# Patient Record
Sex: FEMALE | Race: WHITE | NOT HISPANIC OR LATINO | Employment: UNEMPLOYED | URBAN - METROPOLITAN AREA
[De-identification: names, ages, dates, MRNs, and addresses within clinical notes are randomized per-mention and may not be internally consistent; named-entity substitution may affect disease eponyms.]

---

## 2018-09-03 ENCOUNTER — OFFICE VISIT (OUTPATIENT)
Dept: URGENT CARE | Facility: CLINIC | Age: 43
End: 2018-09-03
Payer: OTHER GOVERNMENT

## 2018-09-03 ENCOUNTER — APPOINTMENT (OUTPATIENT)
Dept: RADIOLOGY | Facility: IMAGING CENTER | Age: 43
End: 2018-09-03
Attending: NURSE PRACTITIONER
Payer: OTHER GOVERNMENT

## 2018-09-03 VITALS
HEIGHT: 64 IN | SYSTOLIC BLOOD PRESSURE: 122 MMHG | BODY MASS INDEX: 25.1 KG/M2 | TEMPERATURE: 98.8 F | WEIGHT: 147 LBS | DIASTOLIC BLOOD PRESSURE: 80 MMHG | OXYGEN SATURATION: 100 % | RESPIRATION RATE: 14 BRPM | HEART RATE: 89 BPM

## 2018-09-03 DIAGNOSIS — R07.9 CHEST PAIN, UNSPECIFIED TYPE: ICD-10-CM

## 2018-09-03 DIAGNOSIS — R06.02 SOB (SHORTNESS OF BREATH): ICD-10-CM

## 2018-09-03 LAB — EKG 4674: NORMAL

## 2018-09-03 PROCEDURE — 93000 ELECTROCARDIOGRAM COMPLETE: CPT | Performed by: NURSE PRACTITIONER

## 2018-09-03 PROCEDURE — 71046 X-RAY EXAM CHEST 2 VIEWS: CPT | Mod: TC | Performed by: NURSE PRACTITIONER

## 2018-09-03 PROCEDURE — 99204 OFFICE O/P NEW MOD 45 MIN: CPT | Performed by: NURSE PRACTITIONER

## 2018-09-03 RX ORDER — LORATADINE 10 MG/1
10 TABLET ORAL DAILY
COMMUNITY
End: 2022-10-12

## 2018-09-03 NOTE — PROGRESS NOTES
Chief Complaint   Patient presents with   • Difficulty Breathing     shortness of breath, chest congestion for past month, worse past 2 weeks       HISTORY OF PRESENT ILLNESS: Patient is a 43 y.o. female who presents to urgent care today with complaints of shortness of breath and chest pain. The patient reports that she has had both shortness of breath and intermittent chest pain for the past month which has been worsening. The chest pain is substernal, lasting seconds to minutes, primarily with laying flat. She admits to shortness of breath, which worsens with exacerbation. She states that she has a difficult time performing daily tasks due to her shortness of breath. She denies associated leg pain, leg swelling, fever, chills, cough, or respiratory distress. She does not take birth control, denies any clotting history. She denies a history of asthma but does admit to a history of seasonal allergies as a child. She has not seen her PCP for this.      There are no active problems to display for this patient.      Allergies:Patient has no known allergies.    Current Outpatient Prescriptions Ordered in [facility]   Medication Sig Dispense Refill   • loratadine (CLARITIN) 10 MG Tab Take 10 mg by mouth every day.       No current [facility]-ordered facility-administered medications on file.        History reviewed. No pertinent past medical history.    Social History   Substance Use Topics   • Smoking status: Never Smoker   • Smokeless tobacco: Never Used   • Alcohol use Not on file       No family status information on file.   History reviewed. No pertinent family history.    ROS:  Review of Systems   Constitutional: Negative for fever, chills, weight loss, malaise, and fatigue.   HENT: Negative for ear pain, nosebleeds, congestion, sore throat and neck pain.    Eyes: Negative for vision changes.   Neuro: Negative for headache, sensory changes, weakness, seizure, LOC.   Cardiovascular: Positive for chest pain. Negative for  "palpitations, orthopnea and leg swelling.   Respiratory: Positive for shortness of breath. Negative for cough, sputum production, and wheezing.   Gastrointestinal: Negative for abdominal pain, nausea, vomiting or diarrhea.   Genitourinary: Negative for dysuria, urgency and frequency.  Musculoskeletal: Negative for falls, neck pain, back pain, joint pain, myalgias.   Skin: Negative for rash, diaphoresis.     Exam:  Blood pressure 122/80, pulse 89, temperature 37.1 °C (98.8 °F), resp. rate 14, height 1.626 m (5' 4\"), weight 66.7 kg (147 lb), SpO2 100 %.  General: well-nourished, well-developed female in NAD  Head: normocephalic, atraumatic  Eyes: PERRLA, no conjunctival injection, acuity grossly intact, lids normal.  Ears: normal shape and symmetry, no tenderness, no discharge. External canals are without any significant edema or erythema. Tympanic membranes are without any inflammation, no effusion. Gross auditory acuity is intact.  Nose: symmetrical without tenderness, no discharge.  Mouth/Throat: reasonable hygiene, no erythema, exudates or tonsillar enlargement.  Neck: no masses, range of motion within normal limits, no tracheal deviation. No obvious thyroid enlargement.   Lymph: no cervical adenopathy. No supraclavicular adenopathy.   Neuro: alert and oriented. Cranial nerves 1-12 grossly intact. No sensory deficit.   Cardiovascular: regular rate and rhythm. No edema.  Pulmonary: no distress. Chest is symmetrical with respiration, no wheezes, crackles, or rhonchi.   Musculoskeletal: no clubbing, appropriate muscle tone, gait is stable.  Skin: warm, dry, intact, no clubbing, no cyanosis, no rashes.   Psych: appropriate mood, affect, judgement.         Assessment/Plan:  1. SOB (shortness of breath)  DX-CHEST-2 VIEWS    EKG   2. Chest pain, unspecified type  DX-CHEST-2 VIEWS    EKG       DX chest reviewed by myself, radiology reading \"No evidence of acute cardiopulmonary process.\"    EKG Interpretation   Interpreted " by me   Rhythm: normal sinus   Rate: normal   Axis: normal   Ectopy: none   Conduction: normal   ST Segments: no acute change   T Waves: no acute change  Clinical Impression: no acute changes and normal EKG        The patient is a pleasant 43-year-old female who presents to the clinic with one month of intermittent chest pain and shortness of breath, symptoms worsening. Her EKG and chest x-ray are negative today. Nevertheless, I discussed with the patient I cannot exclude cardiac and/or significant respiratory etiology in the urgent care setting. Therefore, I feel the patient requires a higher level of care including closer monitoring, stat lab work and/or imaging for further evaluation. This has been discussed with the patient and she states agreement and understanding. I discussed with her that I recommend EMS transfer at this time. However, patient is deciding against medical advice. The patient is a capable decision maker and understands the risks and benefits of her decision. While I certainly disagree with the patient's decision, I respect the patient's autonomy and will not keep her here against her will. She understand that her decision to decline further care can be reversed at any time. She states that she will go directly to the emergency department of her choice without delay. She is in no acute distress upon departure.          Please note that this dictation was created using voice recognition software. I have made every reasonable attempt to correct obvious errors, but I expect that there are errors of grammar and possibly content that I did not discover before finalizing the note.      SELMA Hilliard.

## 2018-11-02 ENCOUNTER — OFFICE VISIT (OUTPATIENT)
Dept: MEDICAL GROUP | Facility: PHYSICIAN GROUP | Age: 43
End: 2018-11-02
Payer: OTHER GOVERNMENT

## 2018-11-02 VITALS
RESPIRATION RATE: 14 BRPM | OXYGEN SATURATION: 98 % | WEIGHT: 151 LBS | HEART RATE: 98 BPM | TEMPERATURE: 98.3 F | DIASTOLIC BLOOD PRESSURE: 62 MMHG | HEIGHT: 64 IN | BODY MASS INDEX: 25.78 KG/M2 | SYSTOLIC BLOOD PRESSURE: 98 MMHG

## 2018-11-02 DIAGNOSIS — R06.02 SHORTNESS OF BREATH: ICD-10-CM

## 2018-11-02 DIAGNOSIS — Z00.00 WELLNESS EXAMINATION: ICD-10-CM

## 2018-11-02 DIAGNOSIS — Z12.31 ENCOUNTER FOR SCREENING MAMMOGRAM FOR BREAST CANCER: ICD-10-CM

## 2018-11-02 DIAGNOSIS — R63.5 WEIGHT GAIN: ICD-10-CM

## 2018-11-02 PROCEDURE — 99214 OFFICE O/P EST MOD 30 MIN: CPT | Performed by: PHYSICIAN ASSISTANT

## 2018-11-02 RX ORDER — ALBUTEROL SULFATE 90 UG/1
2 AEROSOL, METERED RESPIRATORY (INHALATION) EVERY 6 HOURS PRN
Qty: 8.5 G | Refills: 2 | Status: SHIPPED | OUTPATIENT
Start: 2018-11-02 | End: 2022-10-12

## 2018-11-02 ASSESSMENT — PATIENT HEALTH QUESTIONNAIRE - PHQ9: CLINICAL INTERPRETATION OF PHQ2 SCORE: 0

## 2018-11-05 ENCOUNTER — TELEPHONE (OUTPATIENT)
Dept: MEDICAL GROUP | Facility: PHYSICIAN GROUP | Age: 43
End: 2018-11-05

## 2018-11-05 NOTE — TELEPHONE ENCOUNTER
VOICEMAIL  1. Caller Name: Ml                     Call Back Number: [phone] (home)     2. Message: Patient LVM requesting a call back on how she should schedule her PFT.     3. Patient approves office to leave a detailed voicemail/MyChart message: yes

## 2018-11-05 NOTE — TELEPHONE ENCOUNTER
Phone Number Called: [phone] (home)     Message: I spoke with ino and she was driving and requested I call back and leave instructions on her voicemail.    LVM stating she should call [facility] and request to speak with pulmonology and they will schedule her.     Left Message for patient to call back: no

## 2018-11-07 PROBLEM — R63.5 WEIGHT GAIN: Status: ACTIVE | Noted: 2018-11-07

## 2018-11-07 NOTE — ASSESSMENT & PLAN NOTE
Chronic.  She tells me that she relocated from [place] to [place] in 01/18.  States since relocating she is been experiencing shortness of breath when active.  States symptoms are exacerbated when temperatures are high, when walking upstairs, with altitude changes, and when things are blooming.  Denies past medical history for asthma.  She tells me that she grew up in [place] and experienced environmental and seasonal allergies while living there.  She currently denies nasal congestion, rhinorrhea, postnasal drip, sinus pressure, wheezing, nighttime or daytime cough, peripheral edema, fever, chills.  Denies personal history or family history of DVT/PEs.  Patient is on oral contraceptive.  She tells me that her daughter recently had an upper respiratory infection and a humidifier with Vicks was used.  She tells me that was the best night of sleep she has had.  Denies using a humidifier on a regular basis.  She also tells me that she is recently seen in urgent care on Labor Day for shortness of breath.  X-ray and EKG showed no abnormal findings. Per chart review patient left against medical advice.

## 2018-11-07 NOTE — PROGRESS NOTES
Chief Complaint   Patient presents with   • Establish Care     Pt was seen in  for allergist/asthma   • Referral Needed     allergist       HISTORY OF PRESENT ILLNESS: Ml Rodas is an established 43 y.o. female here to discuss the evaluation and management of:    Shortness of breath  Chronic.  She tells me that she relocated from [place] to [place] in 01/18.  States since relocating she is been experiencing shortness of breath when active.  States symptoms are exacerbated when temperatures are high, when walking upstairs, with altitude changes, and when things are blooming.  Denies past medical history for asthma.  She tells me that she grew up in [place] and experienced environmental and seasonal allergies while living there.  She currently denies nasal congestion, rhinorrhea, postnasal drip, sinus pressure, wheezing, nighttime or daytime cough, peripheral edema, fever, chills.  Denies personal history or family history of DVT/PEs.  Patient is on oral contraceptive.  She tells me that her daughter recently had an upper respiratory infection and a humidifier with Vicks was used.  She tells me that was the best night of sleep she has had.  Denies using a humidifier on a regular basis.  She also tells me that she is recently seen in urgent care on Labor Day for shortness of breath.  X-ray and EKG showed no abnormal findings. Per chart review patient left against medical advice.      Weight gain  Patient states since relocating from [place] to [place] in 0 1/18 she has gained 15 lbs.  Admits she is not as active as she once was.  States her diet is healthy.  Patient is concerned about weight gain.      Patient Active Problem List    Diagnosis Date Noted   • Weight gain 11/07/2018   • Shortness of breath 11/02/2018       Allergies:Patient has no known allergies.    Current Outpatient Prescriptions   Medication Sig Dispense Refill   • albuterol 108 (90 Base) MCG/ACT Aero Soln inhalation aerosol Inhale  2 Puffs by mouth every 6 hours as needed for Shortness of Breath. 8.5 g 2   • loratadine (CLARITIN) 10 MG Tab Take 10 mg by mouth every day.       No current facility-administered medications for this visit.        Social History   Substance Use Topics   • Smoking status: Never Smoker   • Smokeless tobacco: Never Used   • Alcohol use Yes      Comment: 1-2 glasses of wine per night.        Family Status   Relation Status   • Mo Alive   • Fa Alive   • Sis Alive   • MGMo Alive   • MGFa    • PGMo    • PGFa    • Sis Alive   • Son Alive   • Jose Alive     Family History   Problem Relation Age of Onset   • Other Mother         Liver (PBC)   • Prostate cancer Father    • Lung Disease Maternal Grandfather         Smoker   • Heart Disease Paternal Grandmother    • Heart Disease Paternal Grandfather    • No Known Problems Son    • No Known Problems Daughter        ROS:  Review of Systems   Constitutional: Negative for fever, chills, weight loss and malaise/fatigue.  Positive for weight gain.  HENT: Negative for ear pain, nosebleeds, congestion, sore throat and neck pain.    Eyes: Negative for blurred vision.   Respiratory: Negative for cough, sputum production, and wheezing.  Positive for shortness of breath.  Cardiovascular: Negative for chest pain, palpitations, orthopnea and leg swelling.   Gastrointestinal: Negative for heartburn, nausea, vomiting and abdominal pain.   Genitourinary: Negative for dysuria, urgency and frequency.   Musculoskeletal: Negative for myalgias, back pain and joint pain.   Skin: Negative for rash and itching.   Neurological: Negative for dizziness, tingling, tremors, sensory change, focal weakness and headaches.   Endo/Heme/Allergies: Does not bruise/bleed easily.   Psychiatric/Behavioral: Negative for depression, suicidal ideas and memory loss.  The patient is not nervous/anxious and does not have insomnia.    All other systems reviewed and are negative except as in  "HPI.    Exam: Blood pressure (!) 98/62, pulse 98, temperature 36.8 °C (98.3 °F), resp. rate 14, height 1.626 m (5' 4\"), weight 68.5 kg (151 lb), SpO2 98 %. Body mass index is 25.92 kg/m².  General: Normal appearing. No distress.  HEENT: Normocephalic. Eyes conjunctiva clear lids without ptosis, pupils equal and reactive to light accommodation, ears normal shape and contour, canals are clear bilaterally, tympanic membranes are benign, nasal mucosa benign, oropharynx is without erythema, edema or exudates.   Neck: Supple without JVD or bruit. Thyroid is not enlarged.  Pulmonary: Clear to ausculation.  Normal effort. No rales, ronchi, or wheezing.  Cardiovascular: Regular rate and rhythm without murmur. Carotid and radial pulses are intact and equal bilaterally.  Abdomen: Soft, nontender, nondistended. Normal bowel sounds. Liver and spleen are not palpable  Neurologic: Grossly nonfocal.  Cranial nerves are normal.  Lymph: No cervical, supraclavicular or axillary lymph nodes are palpable  Skin: Warm and dry.  No rashes or suspicious skin lesions.  Musculoskeletal: Normal gait. No extremity cyanosis, clubbing, or edema.  Psych: Normal mood and affect. Alert and oriented x3. Judgment and insight is normal.    Medical decision-making and discussion:  1. Shortness of breath  During today's appointment PFTs have been ordered for patient complete.  Patient will be contacted with results.  Patient has been prescribed an albuterol inhaler and advised to take 2 puffs by mouth every 6 hours as needed for shortness of breath.  Advised patient to use inhaler at least 15 minutes prior to activity.  Patient to rinse mouth out after using inhaler.  Discussed common side effects and adverse reactions of medication with patient.  Advised patient if she ever develops shortness of breath that is not alleviated with medication to seek immediate emergency care.  Advised patient to use Claritin or Zyrtec.  Also suggested using a humidifier " in the home.    Depending on PFT results will consider referral to an allergist.  Patient may need a cardiac workup as well.    Discussed importance of healthy diet regular exercise routine with patient.    - PULMONARY FUNCTION TESTS -Test requested: Complete Pulmonary Function Test; Future  - albuterol 108 (90 Base) MCG/ACT Aero Soln inhalation aerosol; Inhale 2 Puffs by mouth every 6 hours as needed for Shortness of Breath.  Dispense: 8.5 g; Refill: 2    2. Wellness examination  In the lab work has been ordered for patient complete.  Patient will be contacted with results.  Emphasized importance of healthy diet regular exercise routine with patient.  She denies doing self breast exams or taking a multivitamin.  Advised patient to do a self breast exam at least once monthly.  Advised patient to make sure that she is getting adequate enough calcium and vitamin D.    - COMP METABOLIC PANEL; Future  - LIPID PROFILE; Future    3. Encounter for screening mammogram for breast cancer  Discussed importance of being screened for breast cancer with patient.  A mammogram has been ordered.  - MA-SCREENING MAMMO BILAT W/TOMOSYNTHESIS W/CAD; Future    4. Weight gain  - Encouraged diet high in fruits, vegetables, and fiber. And a diet low in salt, refined carbohydrates, cholesterol, saturated fat, and trans fatty acids.    - Encouraged  a minimum of 30 minutes of moderate intensity aerobic exercise (eg, brisk walking) is recommended on five days each week. Or 20 minutes of vigorous-intensity aerobic exercise (eg, jogging) on three days each week.           Please note that this dictation was created using voice recognition software. I have made every reasonable attempt to correct obvious errors, but I expect that there are errors of grammar and possibly content that I did not discover before finalizing the note.      Return if symptoms worsen or fail to improve.

## 2018-11-07 NOTE — ASSESSMENT & PLAN NOTE
Patient states since relocating from [place] to [place] in 0 1/18 she has gained 15 lbs.  Admits she is not as active as she once was.  States her diet is healthy.  Patient is concerned about weight gain.

## 2018-11-30 ENCOUNTER — HOSPITAL ENCOUNTER (OUTPATIENT)
Dept: RADIOLOGY | Facility: MEDICAL CENTER | Age: 43
End: 2018-11-30
Attending: PHYSICIAN ASSISTANT
Payer: OTHER GOVERNMENT

## 2018-11-30 DIAGNOSIS — Z12.31 ENCOUNTER FOR SCREENING MAMMOGRAM FOR BREAST CANCER: ICD-10-CM

## 2018-11-30 PROCEDURE — 77067 SCR MAMMO BI INCL CAD: CPT

## 2018-12-03 ENCOUNTER — HOSPITAL ENCOUNTER (OUTPATIENT)
Dept: PULMONOLOGY | Facility: MEDICAL CENTER | Age: 43
End: 2018-12-03
Attending: PHYSICIAN ASSISTANT
Payer: OTHER GOVERNMENT

## 2018-12-03 PROCEDURE — 94729 DIFFUSING CAPACITY: CPT

## 2018-12-03 PROCEDURE — 94729 DIFFUSING CAPACITY: CPT | Mod: 26 | Performed by: INTERNAL MEDICINE

## 2018-12-03 PROCEDURE — 94060 EVALUATION OF WHEEZING: CPT | Mod: 26 | Performed by: INTERNAL MEDICINE

## 2018-12-03 PROCEDURE — 94726 PLETHYSMOGRAPHY LUNG VOLUMES: CPT

## 2018-12-03 PROCEDURE — 94060 EVALUATION OF WHEEZING: CPT

## 2018-12-03 PROCEDURE — 94726 PLETHYSMOGRAPHY LUNG VOLUMES: CPT | Mod: 26 | Performed by: INTERNAL MEDICINE

## 2018-12-03 ASSESSMENT — PULMONARY FUNCTION TESTS
FEV1/FVC: 86
FEV1/FVC_PERCENT_PREDICTED: 103
FVC_PERCENT_PREDICTED: 123
FEV1/FVC_PERCENT_PREDICTED: 105
FEV1_PERCENT_CHANGE: 3
FVC_LLN: 3.06
FVC_LLN: 3.06
FEV1_PERCENT_CHANGE: 1
FVC_PERCENT_PREDICTED: 121
FEV1_LLN: 2.50
FEV1/FVC_PERCENT_CHANGE: 300
FEV1: 3.91
FVC_PREDICTED: 3.66
FEV1/FVC_PERCENT_CHANGE: 2
FEV1/FVC_PERCENT_PREDICTED: 82
FEV1/FVC_PERCENT_LLN: 68
FEV1/FVC: 84
FEV1/FVC_PERCENT_PREDICTED: 106
FEV1/FVC_PERCENT_PREDICTED: 103
FEV1: 3.76
FEV1/FVC_PERCENT_LLN: 68
FEV1/FVC: 84
FEV1/FVC_PREDICTED: 82
FEV1_PERCENT_PREDICTED: 125
FVC: 4.45
FEV1_PREDICTED: 2.99
FVC: 4.53
FEV1_PERCENT_PREDICTED: 130
FEV1_LLN: 2.50
FEV1/FVC: 86.31

## 2018-12-04 ENCOUNTER — TELEPHONE (OUTPATIENT)
Dept: MEDICAL GROUP | Facility: PHYSICIAN GROUP | Age: 43
End: 2018-12-04

## 2018-12-04 NOTE — TELEPHONE ENCOUNTER
----- Message from Heidy Cobb P.A.-C. sent at 12/4/2018  8:10 AM PST -----  Please call patient. Your mammogram came back no evidence of malignancy. You however have dense breasts which may miss small masses. You have an option of doing ultrasound for dense breasts called [facility]. This is not covered by your insurance and can cost close to $200. If you want to do this test please let me know so I can order it. Otherwise we will just do the yearly screening mammogram.      Thank you,    Kimmie TALLEY

## 2018-12-04 NOTE — TELEPHONE ENCOUNTER
Phone Number Called: [phone] (home)       Message: Pt informed by phone.    Left Message for patient to call back: N\A

## 2018-12-05 NOTE — PROCEDURES
REQUESTING PROVIDER:  Heidy Cobb PA-C    REASON FOR REQUEST:  Shortness of breath.    FINDINGS:  1.  Acceptable and reproducible.  2.  FEV1 3.76 liters (125%), FVC 4.45 liters (121%), ratio 84%.  3.  Flow volume loops, normal appearing.  4.  TLC 5.96 liters (117%).  5.  DLCO 30.74 mL/min/mmHg (140%).    IMPRESSION:  Normal spirometry and no response to bronchodilator.  Normal lung   volumes.  Elevated DLCO, which is not unusual at high altitude.       ____________________________________     MD FRANKO Tolliver / ÓSCAR    DD:  12/05/2018 09:47:40  DT:  12/05/2018 10:32:06    D#:  [id number]  Job#:  [id number]    cc: Heidy Cobb PA-C

## 2019-04-09 ENCOUNTER — TELEPHONE (OUTPATIENT)
Dept: MEDICAL GROUP | Facility: PHYSICIAN GROUP | Age: 44
End: 2019-04-09

## 2019-04-09 DIAGNOSIS — Z76.89 ENCOUNTER TO ESTABLISH CARE: ICD-10-CM

## 2019-04-09 NOTE — TELEPHONE ENCOUNTER
Phone Number Called: [phone] (home)       Message: Pt called and stated that when she was seen back in November, she was supposed to get a referral to an allergist.    I do not see a referral was ever placed.      Left Message for patient to call back: N\A

## 2019-04-12 ENCOUNTER — TELEPHONE (OUTPATIENT)
Dept: MEDICAL GROUP | Facility: PHYSICIAN GROUP | Age: 44
End: 2019-04-12

## 2020-01-10 ENCOUNTER — APPOINTMENT (OUTPATIENT)
Dept: MEDICAL GROUP | Facility: PHYSICIAN GROUP | Age: 45
End: 2020-01-10
Payer: OTHER GOVERNMENT

## 2022-09-08 ENCOUNTER — TELEPHONE (OUTPATIENT)
Dept: SCHEDULING | Facility: IMAGING CENTER | Age: 47
End: 2022-09-08

## 2022-10-10 SDOH — ECONOMIC STABILITY: INCOME INSECURITY: IN THE LAST 12 MONTHS, WAS THERE A TIME WHEN YOU WERE NOT ABLE TO PAY THE MORTGAGE OR RENT ON TIME?: NO

## 2022-10-10 SDOH — ECONOMIC STABILITY: FOOD INSECURITY: WITHIN THE PAST 12 MONTHS, YOU WORRIED THAT YOUR FOOD WOULD RUN OUT BEFORE YOU GOT MONEY TO BUY MORE.: NEVER TRUE

## 2022-10-10 SDOH — ECONOMIC STABILITY: INCOME INSECURITY: HOW HARD IS IT FOR YOU TO PAY FOR THE VERY BASICS LIKE FOOD, HOUSING, MEDICAL CARE, AND HEATING?: NOT HARD AT ALL

## 2022-10-10 SDOH — ECONOMIC STABILITY: TRANSPORTATION INSECURITY
IN THE PAST 12 MONTHS, HAS THE LACK OF TRANSPORTATION KEPT YOU FROM MEDICAL APPOINTMENTS OR FROM GETTING MEDICATIONS?: NO

## 2022-10-10 SDOH — HEALTH STABILITY: PHYSICAL HEALTH: ON AVERAGE, HOW MANY DAYS PER WEEK DO YOU ENGAGE IN MODERATE TO STRENUOUS EXERCISE (LIKE A BRISK WALK)?: 7 DAYS

## 2022-10-10 SDOH — ECONOMIC STABILITY: TRANSPORTATION INSECURITY
IN THE PAST 12 MONTHS, HAS LACK OF TRANSPORTATION KEPT YOU FROM MEETINGS, WORK, OR FROM GETTING THINGS NEEDED FOR DAILY LIVING?: NO

## 2022-10-10 SDOH — ECONOMIC STABILITY: HOUSING INSECURITY: IN THE LAST 12 MONTHS, HOW MANY PLACES HAVE YOU LIVED?: 1

## 2022-10-10 SDOH — ECONOMIC STABILITY: HOUSING INSECURITY
IN THE LAST 12 MONTHS, WAS THERE A TIME WHEN YOU DID NOT HAVE A STEADY PLACE TO SLEEP OR SLEPT IN A SHELTER (INCLUDING NOW)?: NO

## 2022-10-10 SDOH — ECONOMIC STABILITY: TRANSPORTATION INSECURITY
IN THE PAST 12 MONTHS, HAS LACK OF RELIABLE TRANSPORTATION KEPT YOU FROM MEDICAL APPOINTMENTS, MEETINGS, WORK OR FROM GETTING THINGS NEEDED FOR DAILY LIVING?: NO

## 2022-10-10 SDOH — HEALTH STABILITY: PHYSICAL HEALTH: ON AVERAGE, HOW MANY MINUTES DO YOU ENGAGE IN EXERCISE AT THIS LEVEL?: 40 MIN

## 2022-10-10 SDOH — HEALTH STABILITY: MENTAL HEALTH
STRESS IS WHEN SOMEONE FEELS TENSE, NERVOUS, ANXIOUS, OR CAN'T SLEEP AT NIGHT BECAUSE THEIR MIND IS TROUBLED. HOW STRESSED ARE YOU?: ONLY A LITTLE

## 2022-10-10 SDOH — ECONOMIC STABILITY: FOOD INSECURITY: WITHIN THE PAST 12 MONTHS, THE FOOD YOU BOUGHT JUST DIDN'T LAST AND YOU DIDN'T HAVE MONEY TO GET MORE.: NEVER TRUE

## 2022-10-10 ASSESSMENT — SOCIAL DETERMINANTS OF HEALTH (SDOH)
HOW OFTEN DO YOU HAVE A DRINK CONTAINING ALCOHOL: 4 OR MORE TIMES A WEEK
HOW MANY DRINKS CONTAINING ALCOHOL DO YOU HAVE ON A TYPICAL DAY WHEN YOU ARE DRINKING: 1 OR 2
HOW OFTEN DO YOU ATTEND CHURCH OR RELIGIOUS SERVICES?: MORE THAN 4 TIMES PER YEAR
HOW OFTEN DO YOU GET TOGETHER WITH FRIENDS OR RELATIVES?: MORE THAN THREE TIMES A WEEK
IN A TYPICAL WEEK, HOW MANY TIMES DO YOU TALK ON THE PHONE WITH FAMILY, FRIENDS, OR NEIGHBORS?: MORE THAN THREE TIMES A WEEK
HOW OFTEN DO YOU ATTENT MEETINGS OF THE CLUB OR ORGANIZATION YOU BELONG TO?: MORE THAN 4 TIMES PER YEAR
HOW OFTEN DO YOU ATTENT MEETINGS OF THE CLUB OR ORGANIZATION YOU BELONG TO?: MORE THAN 4 TIMES PER YEAR
HOW OFTEN DO YOU ATTEND CHURCH OR RELIGIOUS SERVICES?: MORE THAN 4 TIMES PER YEAR
WITHIN THE PAST 12 MONTHS, YOU WORRIED THAT YOUR FOOD WOULD RUN OUT BEFORE YOU GOT THE MONEY TO BUY MORE: NEVER TRUE
DO YOU BELONG TO ANY CLUBS OR ORGANIZATIONS SUCH AS CHURCH GROUPS UNIONS, FRATERNAL OR ATHLETIC GROUPS, OR SCHOOL GROUPS?: YES
HOW OFTEN DO YOU GET TOGETHER WITH FRIENDS OR RELATIVES?: MORE THAN THREE TIMES A WEEK
DO YOU BELONG TO ANY CLUBS OR ORGANIZATIONS SUCH AS CHURCH GROUPS UNIONS, FRATERNAL OR ATHLETIC GROUPS, OR SCHOOL GROUPS?: YES
HOW OFTEN DO YOU HAVE SIX OR MORE DRINKS ON ONE OCCASION: NEVER
IN A TYPICAL WEEK, HOW MANY TIMES DO YOU TALK ON THE PHONE WITH FAMILY, FRIENDS, OR NEIGHBORS?: MORE THAN THREE TIMES A WEEK
HOW HARD IS IT FOR YOU TO PAY FOR THE VERY BASICS LIKE FOOD, HOUSING, MEDICAL CARE, AND HEATING?: NOT HARD AT ALL

## 2022-10-10 ASSESSMENT — LIFESTYLE VARIABLES
HOW OFTEN DO YOU HAVE A DRINK CONTAINING ALCOHOL: 4 OR MORE TIMES A WEEK
AUDIT-C TOTAL SCORE: 4
HOW MANY STANDARD DRINKS CONTAINING ALCOHOL DO YOU HAVE ON A TYPICAL DAY: 1 OR 2
SKIP TO QUESTIONS 9-10: 1
HOW OFTEN DO YOU HAVE SIX OR MORE DRINKS ON ONE OCCASION: NEVER

## 2022-10-12 ENCOUNTER — OFFICE VISIT (OUTPATIENT)
Dept: MEDICAL GROUP | Facility: PHYSICIAN GROUP | Age: 47
End: 2022-10-12
Payer: OTHER GOVERNMENT

## 2022-10-12 VITALS
DIASTOLIC BLOOD PRESSURE: 70 MMHG | HEIGHT: 64 IN | WEIGHT: 161.2 LBS | TEMPERATURE: 97.5 F | OXYGEN SATURATION: 100 % | SYSTOLIC BLOOD PRESSURE: 106 MMHG | BODY MASS INDEX: 27.52 KG/M2 | HEART RATE: 57 BPM

## 2022-10-12 DIAGNOSIS — Z91.09 ENVIRONMENTAL ALLERGIES: ICD-10-CM

## 2022-10-12 DIAGNOSIS — D22.9 ATYPICAL MOLE: ICD-10-CM

## 2022-10-12 PROBLEM — R63.5 WEIGHT GAIN: Status: RESOLVED | Noted: 2018-11-07 | Resolved: 2022-10-12

## 2022-10-12 PROBLEM — R06.02 SHORTNESS OF BREATH: Status: RESOLVED | Noted: 2018-11-02 | Resolved: 2022-10-12

## 2022-10-12 PROCEDURE — 99204 OFFICE O/P NEW MOD 45 MIN: CPT

## 2022-10-12 RX ORDER — CETIRIZINE HYDROCHLORIDE 10 MG/1
CAPSULE, LIQUID FILLED ORAL
COMMUNITY

## 2022-10-12 ASSESSMENT — PATIENT HEALTH QUESTIONNAIRE - PHQ9: CLINICAL INTERPRETATION OF PHQ2 SCORE: 0

## 2022-10-12 NOTE — ASSESSMENT & PLAN NOTE
-Chronic, stable  -Aim to lose apx. 2-4 lbs per month  -Encourage healthy diet and exercise as discussed  -Labs as follows:   Hip Bursitis Rehab  Ask your health care provider which exercises are safe for you. Do exercises exactly as told by your health care provider and adjust them as directed. It is normal to feel mild stretching, pulling, tightness, or discomfort as you do these exercises. Stop right away if you feel sudden pain or your pain gets worse. Do not begin these exercises until told by your health care provider.  Stretching exercise  This exercise warms up your muscles and joints and improves the movement and flexibility of your hip. This exercise also helps to relieve pain and stiffness.  Iliotibial band stretch  An iliotibial band is a strong band of muscle tissue that runs from the outer side of your hip to the outer side of your thigh and knee.  1. Lie on your side with your left / right leg in the top position.  2. Bend your left / right knee and grab your ankle. Stretch out your bottom arm to help you balance.  3. Slowly bring your knee back so your thigh is behind your body.  4. Slowly lower your knee toward the floor until you feel a gentle stretch on the outside of your left / right thigh. If you do not feel a stretch and your knee will not fall farther, place the heel of your other foot on top of your knee and pull your knee down toward the floor with your foot.  5. Hold this position for __________ seconds.  6. Slowly return to the starting position.  Repeat __________ times. Complete this exercise __________ times a day.  Strengthening exercises  These exercises build strength and endurance in your hip and pelvis. Endurance is the ability to use your muscles for a long time, even after they get tired.  Bridge  This exercise strengthens the muscles that move your thigh backward (hip extensors).  1. Lie on your back on a firm surface with your knees bent and your feet flat on the floor.  2. Tighten your buttocks muscles and lift your buttocks off the floor until your trunk is level with your thighs.  ? Do not arch  your back.  ? You should feel the muscles working in your buttocks and the back of your thighs. If you do not feel these muscles, slide your feet 1-2 inches (2.5-5 cm) farther away from your buttocks.  ? If this exercise is too easy, try doing it with your arms crossed over your chest.  3. Hold this position for __________ seconds.  4. Slowly lower your hips to the starting position.  5. Let your muscles relax completely after each repetition.  Repeat __________ times. Complete this exercise __________ times a day.  Squats  This exercise strengthens the muscles in front of your thigh and knee (quadriceps).  1.  front of a table, with your feet and knees pointing straight ahead. You may rest your hands on the table for balance but not for support.  2. Slowly bend your knees and lower your hips like you are going to sit in a chair.  ? Keep your weight over your heels, not over your toes.  ? Keep your lower legs upright so they are parallel with the table legs.  ? Do not let your hips go lower than your knees.  ? Do not bend lower than told by your health care provider.  ? If your hip pain increases, do not bend as low.  3. Hold the squat position for __________ seconds.  4. Slowly push with your legs to return to standing. Do not use your hands to pull yourself to standing.  Repeat __________ times. Complete this exercise __________ times a day.  Hip hike  1. Stand sideways on a bottom step. Stand on your left / right leg with your other foot unsupported next to the step. You can hold on to the railing or wall for balance if needed.  2. Keep your knees straight and your torso square. Then lift your left / right hip up toward the ceiling.  3. Hold this position for __________ seconds.  4. Slowly let your left / right hip lower toward the floor, past the starting position. Your foot should get closer to the floor. Do not lean or bend your knees.  Repeat __________ times. Complete this exercise __________ times a  day.  Single leg stand  1. Without shoes, stand near a railing or in a doorway. You may hold on to the railing or door frame as needed for balance.  2. Squeeze your left / right buttock muscles, then lift up your other foot.  ? Do not let your left / right hip push out to the side.  ? It is helpful to  front of a mirror for this exercise so you can watch your hip.  3. Hold this position for __________ seconds.  Repeat __________ times. Complete this exercise __________ times a day.  This information is not intended to replace advice given to you by your health care provider. Make sure you discuss any questions you have with your health care provider.  Document Released: 01/25/2006 Document Revised: 04/13/2020 Document Reviewed: 04/13/2020  [employer] Patient Education © 2020 [employer]    Hip Bursitis    Hip bursitis is swelling of a fluid-filled sac (bursa) in your hip joint. This swelling (inflammation) can be painful. This condition may come and go over time.  What are the causes?  · Injury to the hip.  · Overuse of the muscles that surround the hip joint.  · An earlier injury or surgery of the hip.  · Arthritis or gout.  · Diabetes.  · Thyroid disease.  · Infection.  · In some cases, the cause may not be known.  What are the signs or symptoms?  · Mild or moderate pain in the hip area. Pain may get worse with movement.  · Tenderness and swelling of the hip, especially on the outer side of the hip.  · In rare cases, the bursa may become infected. This may cause:  ? A fever.  ? Warmth and redness in the area.  Symptoms may come and go.  How is this treated?  This condition is treated by resting, icing, applying pressure (compression), and raising (elevating) the injured area. You may hear this called the RICE treatment.  Treatment may also include:  · Using crutches.  · Draining fluid out of the bursa to help relieve swelling.  · Giving a shot of (injecting) medicine that helps to reduce swelling  (cortisone).  · Other medicines if the bursa is infected.  Follow these instructions at home:  Managing pain, stiffness, and swelling    · If told, put ice on the painful area.  ? Put ice in a plastic bag.  ? Place a towel between your skin and the bag.  ? Leave the ice on for 20 minutes, 2-3 times a day.  ? Raise (elevate) your hip above the level of your heart as much as you can without pain. To do this, try putting a pillow under your hips while you lie down. Stop if this causes pain.  Activity  · Return to your normal activities as told by your doctor. Ask your doctor what activities are safe for you.  · Rest and protect your hip as much as you can until you feel better.  General instructions  · Take over-the-counter and prescription medicines only as told by your doctor.  · Wear wraps that put pressure on your hip (compression wraps) only as told by your doctor.  · Do not use your hip to support your body weight until your doctor says that you can.  · Use crutches as told by your doctor.  · Gently rub and stretch your injured area as often as is comfortable.  · Keep all follow-up visits as told by your doctor. This is important.  How is this prevented?  · Exercise regularly, as told by your doctor.  · Warm up and stretch before being active.  · Cool down and stretch after being active.  · Avoid activities that bother your hip or cause pain.  · Avoid sitting down for long periods at a time.  Contact a doctor if:  · You have a fever.  · You get new symptoms.  · You have trouble walking.  · You have trouble doing everyday activities.  · You have pain that gets worse.  · You have pain that does not get better with medicine.  · You get red skin on your hip area.  · You get a feeling of warmth in your hip area.  Get help right away if:  · You cannot move your hip.  · You have very bad pain.  Summary  · Hip bursitis is swelling of a fluid-filled sac (bursa) in your hip.  · Hip bursitis can be painful.  · Symptoms  often come and go over time.  · This condition is treated with rest, ice, compression, elevation, and medicines.  This information is not intended to replace advice given to you by your health care provider. Make sure you discuss any questions you have with your health care provider.  Document Released: 01/20/2012 Document Revised: 08/26/2019 Document Reviewed: 08/26/2019  [employer] Patient Education © 2020 [employer]

## 2022-10-12 NOTE — ASSESSMENT & PLAN NOTE
Chronic condition stable  - Continue to avoid known triggers  - Continue to take over-the-counter Zyrtec 10 mg daily  - ED precautions given for any shortness of breath or chest pain, progression or worsening of this condition

## 2022-10-12 NOTE — PROGRESS NOTES
"Subjective:     CC:  Diagnoses of Environmental allergies, Atypical mole, and BMI 27.0-27.9,adult were pertinent to this visit.    HISTORY OF THE PRESENT ILLNESS: Patient is a 47 y.o. female. This pleasant patient is here today to establish care and discuss the following problems:    Problem   Environmental Allergies    Chronic  Reports to get occasional SOB, chest heaviness, cough with certain exposures such as scented products (bleach, windex, lavendar, candles). If avoidance occurs she is not bothered by this  -Taking Zyrtec daily which is helpful  -Denies exercise intolerance     Atypical Mole    Patient reports 2 atypical moles on her face, located right cheek and right chin, each apx. 3-5mm in diameter, flesh color, raised.  No evidence of redness or infectious process. These moles have been present most of her life.  No change reported in size, shape, or color  -The mole on her chin has recently started to become pruritic in nature.  This is irritating to her and she would like referral to derm for removal.     Bmi 27.0-27.9,Adult    This is a chronic condition.   Max weight: 161  Current weight: 161  BMI: 27.67  Diet: Regular diet, mostly healthy, occasional fast food  Exercise: 7 days per week: HIIT, strength train, walking (1 mile per day)  Goal Weight: 145       Weight Gain (Resolved)   Shortness of Breath (Resolved)       Health Maintenance: Recommend follow-up for Pap smear and health maintenance topics    ROS:   Review of Systems   All other systems reviewed and are negative.      Objective:     Exam: /70 (BP Location: Left arm, Patient Position: Sitting, BP Cuff Size: Adult)   Pulse (!) 57   Temp 36.4 °C (97.5 °F) (Temporal)   Ht 1.626 m (5' 4\")   Wt 73.1 kg (161 lb 3.2 oz)   SpO2 100%  Body mass index is 27.67 kg/m².    Physical Exam  Constitutional:       General: She is not in acute distress.     Appearance: Normal appearance. She is not ill-appearing or toxic-appearing.   HENT:      " Head: Normocephalic.   Eyes:      Conjunctiva/sclera: Conjunctivae normal.   Cardiovascular:      Rate and Rhythm: Normal rate and regular rhythm.      Pulses: Normal pulses.      Heart sounds: Normal heart sounds. No murmur heard.  Pulmonary:      Effort: Pulmonary effort is normal. No respiratory distress.      Breath sounds: Normal breath sounds.   Skin:     General: Skin is warm and dry.   Neurological:      General: No focal deficit present.      Mental Status: She is alert and oriented to person, place, and time.   Psychiatric:         Mood and Affect: Mood normal.         Behavior: Behavior normal.         Labs: No recent labs    Assessment & Plan: Medical Decision Making   47 y.o. female with the following -    Problem List Items Addressed This Visit       Environmental allergies     Chronic condition stable  - Continue to avoid known triggers  - Continue to take over-the-counter Zyrtec 10 mg daily  - ED precautions given for any shortness of breath or chest pain, progression or worsening of this condition         Atypical mole    Relevant Orders    Referral to Dermatology    BMI 27.0-27.9,adult     -Chronic, stable  -Aim to lose apx. 2-4 lbs per month  -Encourage healthy diet and exercise as discussed  -Labs as follows:         Relevant Orders    Comp Metabolic Panel    Lipid Profile    TSH WITH REFLEX TO FT4       Differential diagnosis, natural history, supportive care, and indications for immediate follow-up discussed.  Shared decision making approach was utilized, and patient is amendable with plan of care.  Patient understands to return to clinic or go to the emergency department if symptoms worsen. All questions and concerns addressed.      Return in about 4 weeks (around 11/9/2022) for Wellness with Pap.    Please note that this dictation was created using voice recognition software. I have made every reasonable attempt to correct obvious errors, but I expect that there are errors of grammar and  possibly content that I did not discover before finalizing the note.

## 2025-08-17 SDOH — ECONOMIC STABILITY: INCOME INSECURITY: HOW HARD IS IT FOR YOU TO PAY FOR THE VERY BASICS LIKE FOOD, HOUSING, MEDICAL CARE, AND HEATING?: NOT HARD AT ALL

## 2025-08-17 SDOH — ECONOMIC STABILITY: FOOD INSECURITY: WITHIN THE PAST 12 MONTHS, THE FOOD YOU BOUGHT JUST DIDN'T LAST AND YOU DIDN'T HAVE MONEY TO GET MORE.: NEVER TRUE

## 2025-08-17 SDOH — HEALTH STABILITY: MENTAL HEALTH
STRESS IS WHEN SOMEONE FEELS TENSE, NERVOUS, ANXIOUS, OR CAN'T SLEEP AT NIGHT BECAUSE THEIR MIND IS TROUBLED. HOW STRESSED ARE YOU?: NOT AT ALL

## 2025-08-17 SDOH — HEALTH STABILITY: PHYSICAL HEALTH: ON AVERAGE, HOW MANY DAYS PER WEEK DO YOU ENGAGE IN MODERATE TO STRENUOUS EXERCISE (LIKE A BRISK WALK)?: 7 DAYS

## 2025-08-17 SDOH — ECONOMIC STABILITY: INCOME INSECURITY: IN THE LAST 12 MONTHS, WAS THERE A TIME WHEN YOU WERE NOT ABLE TO PAY THE MORTGAGE OR RENT ON TIME?: NO

## 2025-08-17 SDOH — ECONOMIC STABILITY: FOOD INSECURITY: WITHIN THE PAST 12 MONTHS, YOU WORRIED THAT YOUR FOOD WOULD RUN OUT BEFORE YOU GOT MONEY TO BUY MORE.: NEVER TRUE

## 2025-08-17 SDOH — HEALTH STABILITY: PHYSICAL HEALTH: ON AVERAGE, HOW MANY MINUTES DO YOU ENGAGE IN EXERCISE AT THIS LEVEL?: 90 MIN

## 2025-08-17 ASSESSMENT — SOCIAL DETERMINANTS OF HEALTH (SDOH)
IN A TYPICAL WEEK, HOW MANY TIMES DO YOU TALK ON THE PHONE WITH FAMILY, FRIENDS, OR NEIGHBORS?: MORE THAN THREE TIMES A WEEK
HOW OFTEN DO YOU HAVE A DRINK CONTAINING ALCOHOL: 4 OR MORE TIMES A WEEK
IN THE PAST 12 MONTHS, HAS THE ELECTRIC, GAS, OIL, OR WATER COMPANY THREATENED TO SHUT OFF SERVICE IN YOUR HOME?: NO
HOW OFTEN DO YOU ATTEND CHURCH OR RELIGIOUS SERVICES?: MORE THAN 4 TIMES PER YEAR
WITHIN THE PAST 12 MONTHS, YOU WORRIED THAT YOUR FOOD WOULD RUN OUT BEFORE YOU GOT THE MONEY TO BUY MORE: NEVER TRUE
HOW MANY DRINKS CONTAINING ALCOHOL DO YOU HAVE ON A TYPICAL DAY WHEN YOU ARE DRINKING: 1 OR 2
HOW HARD IS IT FOR YOU TO PAY FOR THE VERY BASICS LIKE FOOD, HOUSING, MEDICAL CARE, AND HEATING?: NOT HARD AT ALL
DO YOU BELONG TO ANY CLUBS OR ORGANIZATIONS SUCH AS CHURCH GROUPS UNIONS, FRATERNAL OR ATHLETIC GROUPS, OR SCHOOL GROUPS?: YES
HOW OFTEN DO YOU HAVE SIX OR MORE DRINKS ON ONE OCCASION: NEVER
IN A TYPICAL WEEK, HOW MANY TIMES DO YOU TALK ON THE PHONE WITH FAMILY, FRIENDS, OR NEIGHBORS?: MORE THAN THREE TIMES A WEEK
HOW OFTEN DO YOU ATTENT MEETINGS OF THE CLUB OR ORGANIZATION YOU BELONG TO?: MORE THAN 4 TIMES PER YEAR
HOW OFTEN DO YOU ATTENT MEETINGS OF THE CLUB OR ORGANIZATION YOU BELONG TO?: MORE THAN 4 TIMES PER YEAR
HOW OFTEN DO YOU GET TOGETHER WITH FRIENDS OR RELATIVES?: MORE THAN THREE TIMES A WEEK
HOW OFTEN DO YOU GET TOGETHER WITH FRIENDS OR RELATIVES?: MORE THAN THREE TIMES A WEEK
DO YOU BELONG TO ANY CLUBS OR ORGANIZATIONS SUCH AS CHURCH GROUPS UNIONS, FRATERNAL OR ATHLETIC GROUPS, OR SCHOOL GROUPS?: YES
HOW OFTEN DO YOU ATTEND CHURCH OR RELIGIOUS SERVICES?: MORE THAN 4 TIMES PER YEAR

## 2025-08-17 ASSESSMENT — LIFESTYLE VARIABLES
SKIP TO QUESTIONS 9-10: 1
HOW OFTEN DO YOU HAVE A DRINK CONTAINING ALCOHOL: 4 OR MORE TIMES A WEEK
HOW MANY STANDARD DRINKS CONTAINING ALCOHOL DO YOU HAVE ON A TYPICAL DAY: 1 OR 2
AUDIT-C TOTAL SCORE: 4
HOW OFTEN DO YOU HAVE SIX OR MORE DRINKS ON ONE OCCASION: NEVER

## 2025-08-20 ENCOUNTER — APPOINTMENT (OUTPATIENT)
Dept: MEDICAL GROUP | Facility: PHYSICIAN GROUP | Age: 50
End: 2025-08-20
Payer: OTHER GOVERNMENT

## 2025-08-20 ENCOUNTER — HOSPITAL ENCOUNTER (OUTPATIENT)
Facility: MEDICAL CENTER | Age: 50
End: 2025-08-20
Payer: OTHER GOVERNMENT

## 2025-08-20 ASSESSMENT — PATIENT HEALTH QUESTIONNAIRE - PHQ9: CLINICAL INTERPRETATION OF PHQ2 SCORE: 0

## 2025-08-21 DIAGNOSIS — Z01.419 WELL WOMAN EXAM: ICD-10-CM

## 2025-08-26 ENCOUNTER — HOSPITAL ENCOUNTER (OUTPATIENT)
Dept: RADIOLOGY | Facility: MEDICAL CENTER | Age: 50
End: 2025-08-26
Payer: OTHER GOVERNMENT

## 2025-08-26 VITALS — BODY MASS INDEX: 21 KG/M2 | HEIGHT: 64 IN | WEIGHT: 123 LBS

## 2025-08-26 DIAGNOSIS — Z01.419 WELL WOMAN EXAM: ICD-10-CM

## 2025-08-26 DIAGNOSIS — Z12.31 ENCOUNTER FOR SCREENING MAMMOGRAM FOR MALIGNANT NEOPLASM OF BREAST: ICD-10-CM

## 2025-08-26 PROCEDURE — 77067 SCR MAMMO BI INCL CAD: CPT
